# Patient Record
Sex: FEMALE | Race: WHITE | NOT HISPANIC OR LATINO | URBAN - METROPOLITAN AREA
[De-identification: names, ages, dates, MRNs, and addresses within clinical notes are randomized per-mention and may not be internally consistent; named-entity substitution may affect disease eponyms.]

---

## 2017-06-05 ENCOUNTER — OUTPATIENT (OUTPATIENT)
Dept: OUTPATIENT SERVICES | Facility: HOSPITAL | Age: 82
LOS: 1 days | End: 2017-06-05

## 2017-06-20 ENCOUNTER — OUTPATIENT (OUTPATIENT)
Dept: OUTPATIENT SERVICES | Facility: HOSPITAL | Age: 82
LOS: 1 days | End: 2017-06-20

## 2017-06-20 ENCOUNTER — INPATIENT (INPATIENT)
Facility: HOSPITAL | Age: 82
LOS: 1 days | Discharge: NURSING, REHAB OR LONG-TERM CARE | End: 2017-06-22
Payer: MEDICARE

## 2017-06-20 PROCEDURE — 72170 X-RAY EXAM OF PELVIS: CPT | Mod: 26

## 2017-06-21 ENCOUNTER — OUTPATIENT (OUTPATIENT)
Dept: OUTPATIENT SERVICES | Facility: HOSPITAL | Age: 82
LOS: 1 days | End: 2017-06-21

## 2017-06-22 ENCOUNTER — INPATIENT (INPATIENT)
Facility: HOSPITAL | Age: 82
LOS: 8 days | Discharge: HOME | End: 2017-07-01

## 2017-06-22 ENCOUNTER — OUTPATIENT (OUTPATIENT)
Dept: OUTPATIENT SERVICES | Facility: HOSPITAL | Age: 82
LOS: 1 days | End: 2017-06-22

## 2023-09-25 PROBLEM — Z00.00 ENCOUNTER FOR PREVENTIVE HEALTH EXAMINATION: Status: ACTIVE | Noted: 2023-09-25

## 2023-09-26 ENCOUNTER — APPOINTMENT (OUTPATIENT)
Dept: BREAST CENTER | Facility: CLINIC | Age: 88
End: 2023-09-26
Payer: MEDICARE

## 2023-09-26 VITALS
TEMPERATURE: 84 F | BODY MASS INDEX: 27.49 KG/M2 | DIASTOLIC BLOOD PRESSURE: 74 MMHG | HEIGHT: 65 IN | HEART RATE: 56 BPM | WEIGHT: 165 LBS | OXYGEN SATURATION: 98 % | SYSTOLIC BLOOD PRESSURE: 134 MMHG

## 2023-09-26 DIAGNOSIS — Z86.39 PERSONAL HISTORY OF OTHER ENDOCRINE, NUTRITIONAL AND METABOLIC DISEASE: ICD-10-CM

## 2023-09-26 DIAGNOSIS — Z78.9 OTHER SPECIFIED HEALTH STATUS: ICD-10-CM

## 2023-09-26 DIAGNOSIS — Z63.4 DISAPPEARANCE AND DEATH OF FAMILY MEMBER: ICD-10-CM

## 2023-09-26 DIAGNOSIS — Z80.42 FAMILY HISTORY OF MALIGNANT NEOPLASM OF PROSTATE: ICD-10-CM

## 2023-09-26 DIAGNOSIS — Z86.79 PERSONAL HISTORY OF OTHER DISEASES OF THE CIRCULATORY SYSTEM: ICD-10-CM

## 2023-09-26 PROCEDURE — 99204 OFFICE O/P NEW MOD 45 MIN: CPT

## 2023-09-26 SDOH — SOCIAL STABILITY - SOCIAL INSECURITY: DISSAPEARANCE AND DEATH OF FAMILY MEMBER: Z63.4

## 2023-09-27 PROBLEM — Z78.9 DOES NOT USE ILLICIT DRUGS: Status: ACTIVE | Noted: 2023-09-26

## 2023-09-27 PROBLEM — Z78.9 NON-SMOKER: Status: ACTIVE | Noted: 2023-09-26

## 2023-09-27 PROBLEM — Z63.4 WIDOWED: Status: ACTIVE | Noted: 2023-09-26

## 2023-09-27 PROBLEM — Z78.9 NO FAMILY HISTORY OF BREAST CANCER: Status: ACTIVE | Noted: 2023-09-27

## 2023-09-27 PROBLEM — Z78.9 SOCIAL ALCOHOL USE: Status: ACTIVE | Noted: 2023-09-26

## 2023-09-27 PROBLEM — Z80.42 FAMILY HISTORY OF MALIGNANT NEOPLASM OF PROSTATE: Status: ACTIVE | Noted: 2023-09-26

## 2023-09-27 RX ORDER — ATORVASTATIN CALCIUM 80 MG/1
TABLET, FILM COATED ORAL
Refills: 0 | Status: ACTIVE | COMMUNITY

## 2023-09-27 RX ORDER — LEVOTHYROXINE SODIUM 0.17 MG/1
TABLET ORAL
Refills: 0 | Status: ACTIVE | COMMUNITY

## 2023-09-27 RX ORDER — SOLIFENACIN SUCCINATE 10 MG/1
TABLET ORAL
Refills: 0 | Status: ACTIVE | COMMUNITY

## 2023-09-27 RX ORDER — VIT A/VIT C/VIT E/ZINC/COPPER 4296-226
CAPSULE ORAL
Refills: 0 | Status: ACTIVE | COMMUNITY

## 2023-09-27 RX ORDER — CARVEDILOL 3.12 MG/1
TABLET, FILM COATED ORAL
Refills: 0 | Status: ACTIVE | COMMUNITY

## 2023-09-28 PROBLEM — Z86.39 HISTORY OF HASHIMOTO THYROIDITIS: Status: RESOLVED | Noted: 2023-09-26 | Resolved: 2023-09-28

## 2023-09-28 PROBLEM — Z86.39 HISTORY OF HIGH CHOLESTEROL: Status: RESOLVED | Noted: 2023-09-26 | Resolved: 2023-09-28

## 2023-09-28 PROBLEM — Z86.79 HISTORY OF HYPERTENSION: Status: RESOLVED | Noted: 2023-09-26 | Resolved: 2023-09-28

## 2023-10-12 ENCOUNTER — NON-APPOINTMENT (OUTPATIENT)
Age: 88
End: 2023-10-12

## 2023-10-12 ENCOUNTER — APPOINTMENT (OUTPATIENT)
Dept: MRI IMAGING | Facility: CLINIC | Age: 88
End: 2023-10-12
Payer: MEDICARE

## 2023-10-12 PROCEDURE — A9585: CPT | Mod: JW

## 2023-10-12 PROCEDURE — 77049 MRI BREAST C-+ W/CAD BI: CPT

## 2023-10-13 ENCOUNTER — OUTPATIENT (OUTPATIENT)
Dept: OUTPATIENT SERVICES | Facility: HOSPITAL | Age: 88
LOS: 1 days | End: 2023-10-13

## 2023-10-13 ENCOUNTER — NON-APPOINTMENT (OUTPATIENT)
Age: 88
End: 2023-10-13

## 2023-10-13 DIAGNOSIS — C50.919 MALIGNANT NEOPLASM OF UNSPECIFIED SITE OF UNSPECIFIED FEMALE BREAST: ICD-10-CM

## 2023-10-19 ENCOUNTER — APPOINTMENT (OUTPATIENT)
Dept: HEMATOLOGY ONCOLOGY | Facility: CLINIC | Age: 88
End: 2023-10-19
Payer: MEDICARE

## 2023-10-19 VITALS
TEMPERATURE: 98.1 F | HEIGHT: 65 IN | HEART RATE: 58 BPM | DIASTOLIC BLOOD PRESSURE: 84 MMHG | WEIGHT: 162 LBS | OXYGEN SATURATION: 94 % | SYSTOLIC BLOOD PRESSURE: 193 MMHG | BODY MASS INDEX: 26.99 KG/M2

## 2023-10-19 PROCEDURE — 99205 OFFICE O/P NEW HI 60 MIN: CPT

## 2023-10-19 RX ORDER — TIMOLOL MALEATE 5 MG/ML
SOLUTION/ DROPS OPHTHALMIC
Refills: 0 | Status: ACTIVE | COMMUNITY

## 2023-10-21 ENCOUNTER — APPOINTMENT (OUTPATIENT)
Dept: RADIOLOGY | Facility: CLINIC | Age: 88
End: 2023-10-21
Payer: MEDICARE

## 2023-10-21 PROCEDURE — 77080 DXA BONE DENSITY AXIAL: CPT

## 2023-11-17 ENCOUNTER — APPOINTMENT (OUTPATIENT)
Dept: HEMATOLOGY ONCOLOGY | Facility: CLINIC | Age: 88
End: 2023-11-17
Payer: MEDICARE

## 2023-11-17 VITALS
OXYGEN SATURATION: 96 % | BODY MASS INDEX: 27.99 KG/M2 | HEART RATE: 64 BPM | SYSTOLIC BLOOD PRESSURE: 169 MMHG | HEIGHT: 65 IN | DIASTOLIC BLOOD PRESSURE: 89 MMHG | TEMPERATURE: 97.4 F | WEIGHT: 168 LBS

## 2023-11-17 PROCEDURE — 99214 OFFICE O/P EST MOD 30 MIN: CPT

## 2024-01-30 ENCOUNTER — RESULT REVIEW (OUTPATIENT)
Age: 89
End: 2024-01-30

## 2024-01-30 ENCOUNTER — NON-APPOINTMENT (OUTPATIENT)
Age: 89
End: 2024-01-30

## 2024-01-30 ENCOUNTER — APPOINTMENT (OUTPATIENT)
Dept: ULTRASOUND IMAGING | Facility: CLINIC | Age: 89
End: 2024-01-30
Payer: MEDICARE

## 2024-01-30 PROCEDURE — 76641 ULTRASOUND BREAST COMPLETE: CPT | Mod: LT,GY

## 2024-02-08 ENCOUNTER — OUTPATIENT (OUTPATIENT)
Dept: OUTPATIENT SERVICES | Facility: HOSPITAL | Age: 89
LOS: 1 days | End: 2024-02-08

## 2024-02-08 DIAGNOSIS — C50.919 MALIGNANT NEOPLASM OF UNSPECIFIED SITE OF UNSPECIFIED FEMALE BREAST: ICD-10-CM

## 2024-02-12 ENCOUNTER — APPOINTMENT (OUTPATIENT)
Dept: HEMATOLOGY ONCOLOGY | Facility: CLINIC | Age: 89
End: 2024-02-12
Payer: MEDICARE

## 2024-02-12 VITALS
HEIGHT: 65 IN | HEART RATE: 59 BPM | WEIGHT: 150 LBS | OXYGEN SATURATION: 95 % | TEMPERATURE: 96.4 F | BODY MASS INDEX: 24.99 KG/M2 | DIASTOLIC BLOOD PRESSURE: 71 MMHG | SYSTOLIC BLOOD PRESSURE: 177 MMHG

## 2024-02-12 PROCEDURE — G2211 COMPLEX E/M VISIT ADD ON: CPT

## 2024-02-12 PROCEDURE — 99214 OFFICE O/P EST MOD 30 MIN: CPT

## 2024-02-12 RX ORDER — PANTOPRAZOLE 40 MG/1
40 TABLET, DELAYED RELEASE ORAL
Refills: 0 | Status: ACTIVE | COMMUNITY

## 2024-02-12 NOTE — PHYSICAL EXAM
[Restricted in physically strenuous activity but ambulatory and able to carry out work of a light or sedentary nature] : Status 1- Restricted in physically strenuous activity but ambulatory and able to carry out work of a light or sedentary nature, e.g., light house work, office work [Normal] : affect appropriate [de-identified] : generally well appearing elderly female, NAD, pleasant  [de-identified] : palpable mass in the L breast measuring 2-3cm, slight skin dimpling, no other palpable masses or LAD bilaterally- L breast mass feels softer on exam

## 2024-02-12 NOTE — HISTORY OF PRESENT ILLNESS
[de-identified] : Referred by:  Dr. Melinda Clark PCP:  Sivakumar Hager - Russ Walker (ph [phone])           Kulwant Walker - (ph [phone])   Breast Cancer Summary:  DIAGNOSIS: Left breast cancer  PROCEDURE AND DATE: Left breast biopsy 23  PATHOLOGY: invasive ductal carcinoma, grade 3, ER+, OH+, Her-2/myrtle negative.  STAGE:  clinical stage II  Chemotherapy: oncotype 29- on neoadjuvant arimidex at this time  Radiation:  pending  Hormonal:  Neoadjuvant arimidex started 2023  STATUS: active, on neoadjuvant therapy  BRCA/Genetics STATUS: Equ[employer] DATA multi-gene panel 23 with VUS in [facility]  Missy Walker is a post-menopausal female who presented at age 89 in 2023 for evaluation of left breast cancer. The patient has a medical history of HTN, HLD, Hashimoto thyroiditis, heart murmur (mitral valve), early macular degeneration (on vitamins).  Surgical hx: bilateral hip replacement, hysterectomy w/ BSO (heavy menstrual bleeding), non-melanomatous skin cancer removed R forearm, hx lumbar spine injections.  Her history dates back to 2023 when she palpated a pea-sized lump in the left breast.  Imaging showed spiculated mass in the superior aspect of the left breast with dimpling of the overlying skin.  Biopsy on 23 showed invasive ductal carcinoma, grade 3, ER+, OH+, Her-2/myrtle negative.  She saw Dr. Melinda Clark on 23 for initial consult.  Breast MRI was completed on 10/12/23 and revealed a 3cm tumor, no evidence of lymphadenopathy or multicentric disease.  She was also referred to radiation oncology - still needs to schedule.     At today's visit she reports she is in her usual state of health. Denies recent headaches, visual changes, balance issues, CP, cough, SOB, n/v/d, constipation, unintentional weight loss or new bone or back pain. She is independent in her ADLs. Her son Abraham lives with her.   Imaging reviewed: 23 - bilateral mammogram w/ L US - speculated mass in superior aspect of left breast with dimpling of overlying skin measuring approximately 2.5 cm (not including fine radiating lines).   10/12/23 - breast MRI - biopsy-proven left breast cancer (no significant lymphadenopathy or evidence for multicentric or contralateral disease).  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 - Smoking/etoh/illicits:  Never smoker, occasional wine w/ dinner, no illicits  - Exercise:  none   OB/GYN Hx: - Age of menarche:  12 - Age of menopause:  hysterectomy @ 40 - Pregnancy history:   (1 passed away);  - Age at live birth: 22 - OCP use:  < 1 yr - Fertility treatments:  n/a - Hormonal therapy:  Premarin after hysterectomy --> can't remember how long she took - Breast feeding history:  none   FH: - son (Russ) - prostate cancer - son (Kulwant) - prostate cancer  [de-identified] : Missy presents on 2/12/24 for follow up for L breast cancer.   At today's visit Missy is doing very well. She initiated arimidex in September 2023- she is tolerating this very well. Denies hot flashes, vaginal dryness or joint pains. Feels the breast mass may be slightly smaller.  Reviewed normal DEXA results from 10/21/23- continues to take calcium and vitamin D.  Her oncotype report resulted as a 29, corresponding to 18% risk of distant recurrence, >15% chemotherapy benefit.   Breast US 1/30/24 reviewed: Suspicious mass again noted in the 12:00 location. There is slight decrease in size when measured in a similar plane from the priors. Mass currently measures 2.4 x 1.6 cm where previous measurements were 2.8 x 1.8 cm

## 2024-02-12 NOTE — RESULTS/DATA
[FreeTextEntry1] : #Left breast cancer- clinical stage T2N0, anatomic stage IIA, pathologic prognostic stage IB Palpated a mass in the left breast in August 2023.  She is now s/p L breast biopsy 9/21/23 which revealed invasive ductal carcinoma (IDC), poorly differentiated, measuring 15mm, w/ DCIS (solid architecture, intermediate grade), ER %, MD %, Her-2/myrtle negative (1+), ki67 20%.  The mass measures 3cm in maximum diameter on MRI, there was no significant lymphadenopathy.  We discussed the excellent prognosis of stage I, ER positive, MD positive, node negative breast cancer. We explained that recent data suggests that chemotherapy may be spared for many patients with this type of breast cancer (up to 85%). We discussed the use of Oncotype DX genomic profile to determine the risk of recurrence and benefit from chemotherapy based on the results of the Tailor Rx Study to better determine which patients should receive chemotherapy in addition to hormonal therapy.  At today's visit we discussed that she would likely benefit from neoadjuvant therapy given the early skin changes in the left breast.  Discussed arimidex 1mg PO daily for anti-estrogen therapy. She initiated arimidex in September 2023- she is tolerating this very well. Denies hot flashes, vaginal dryness or joint pains.  Feels the breast mass may be slightly smaller at today's visit.  Her oncotype report resulted as a 29, corresponding to 18% risk of distant recurrence, >15% chemotherapy benefit.  Case discussed w/ multidisciplinary team- would continue neoadjuvant arimidex at this time, will defer chemotherapy at this time unless she does not respond to AI therapy.  Repeat breast US Jan 2024 shows slight decrease in size of the L breast mass. It feels softer on exam.  Will continue neoadjuvant arimidex, repeat L breast US in early May 2024.  If the tumor starts to progress, consider other endocrine options vs. CMF.  Plan ultimately for possible lumpectomy and consideration for RT.  Genetics:  [employer] multi-gene panel 9/26/23 with VUS in [facility] All patient questions answered at today's visit. Patient urged to call the office with any questions or concerns.   I personally have spent a total of 35 minutes of time on the date of this encounter reviewing test results, documenting findings, coordinating care and directly consulting with the patient and/or designated family member. Greater than 50% of the face to face encounter time was spent on counseling and/or coordination of care for left breast cancer.

## 2024-03-04 ENCOUNTER — NON-APPOINTMENT (OUTPATIENT)
Age: 89
End: 2024-03-04

## 2024-03-05 ENCOUNTER — NON-APPOINTMENT (OUTPATIENT)
Age: 89
End: 2024-03-05

## 2024-03-22 ENCOUNTER — RX ONLY (RX ONLY)
Age: 89
End: 2024-03-22

## 2024-03-22 ENCOUNTER — OFFICE (OUTPATIENT)
Dept: URBAN - METROPOLITAN AREA CLINIC 9 | Facility: CLINIC | Age: 89
Setting detail: OPHTHALMOLOGY
End: 2024-03-22
Payer: MEDICARE

## 2024-03-22 DIAGNOSIS — H02.135: ICD-10-CM

## 2024-03-22 DIAGNOSIS — H01.001: ICD-10-CM

## 2024-03-22 DIAGNOSIS — H18.423: ICD-10-CM

## 2024-03-22 DIAGNOSIS — H00.12: ICD-10-CM

## 2024-03-22 DIAGNOSIS — H02.132: ICD-10-CM

## 2024-03-22 DIAGNOSIS — H01.004: ICD-10-CM

## 2024-03-22 DIAGNOSIS — H40.1130: ICD-10-CM

## 2024-03-22 PROCEDURE — 99203 OFFICE O/P NEW LOW 30 MIN: CPT | Performed by: OPHTHALMOLOGY

## 2024-03-22 ASSESSMENT — LID EXAM ASSESSMENTS
OD_BLEPHARITIS: RUL 2+
OS_BLEPHARITIS: LUL 2+

## 2024-03-22 ASSESSMENT — LID POSITION - ECTROPION
OD_ECTROPION: RLL 2+
OS_ECTROPION: LLL 2+

## 2024-05-14 ENCOUNTER — RESULT REVIEW (OUTPATIENT)
Age: 89
End: 2024-05-14

## 2024-05-14 ENCOUNTER — APPOINTMENT (OUTPATIENT)
Dept: ULTRASOUND IMAGING | Facility: CLINIC | Age: 89
End: 2024-05-14
Payer: MEDICARE

## 2024-05-14 PROCEDURE — 76642 ULTRASOUND BREAST LIMITED: CPT | Mod: LT

## 2024-05-22 ENCOUNTER — OUTPATIENT (OUTPATIENT)
Dept: OUTPATIENT SERVICES | Facility: HOSPITAL | Age: 89
LOS: 1 days | End: 2024-05-22

## 2024-05-22 DIAGNOSIS — C50.919 MALIGNANT NEOPLASM OF UNSPECIFIED SITE OF UNSPECIFIED FEMALE BREAST: ICD-10-CM

## 2024-05-24 ENCOUNTER — OFFICE (OUTPATIENT)
Dept: URBAN - METROPOLITAN AREA CLINIC 9 | Facility: CLINIC | Age: 89
Setting detail: OPHTHALMOLOGY
End: 2024-05-24
Payer: MEDICARE

## 2024-05-24 DIAGNOSIS — H02.135: ICD-10-CM

## 2024-05-24 DIAGNOSIS — Z96.1: ICD-10-CM

## 2024-05-24 DIAGNOSIS — H40.1130: ICD-10-CM

## 2024-05-24 DIAGNOSIS — H01.001: ICD-10-CM

## 2024-05-24 DIAGNOSIS — H02.132: ICD-10-CM

## 2024-05-24 DIAGNOSIS — H18.423: ICD-10-CM

## 2024-05-24 DIAGNOSIS — H01.004: ICD-10-CM

## 2024-05-24 DIAGNOSIS — H52.4: ICD-10-CM

## 2024-05-24 DIAGNOSIS — H35.033: ICD-10-CM

## 2024-05-24 PROBLEM — H31.013 MACULAR SCAR; BOTH EYES: Status: ACTIVE | Noted: 2024-05-24

## 2024-05-24 PROCEDURE — 92015 DETERMINE REFRACTIVE STATE: CPT | Performed by: OPHTHALMOLOGY

## 2024-05-24 PROCEDURE — 92250 FUNDUS PHOTOGRAPHY W/I&R: CPT | Performed by: OPHTHALMOLOGY

## 2024-05-24 PROCEDURE — 92014 COMPRE OPH EXAM EST PT 1/>: CPT | Performed by: OPHTHALMOLOGY

## 2024-05-24 ASSESSMENT — LID EXAM ASSESSMENTS
OD_BLEPHARITIS: RUL 2+
OS_BLEPHARITIS: LUL 2+

## 2024-05-24 ASSESSMENT — LID POSITION - ECTROPION
OS_ECTROPION: LLL 2+
OD_ECTROPION: RLL 2+

## 2024-05-24 ASSESSMENT — CONFRONTATIONAL VISUAL FIELD TEST (CVF)
OD_FINDINGS: FULL
OS_FINDINGS: FULL

## 2024-05-30 ENCOUNTER — APPOINTMENT (OUTPATIENT)
Dept: HEMATOLOGY ONCOLOGY | Facility: CLINIC | Age: 89
End: 2024-05-30
Payer: MEDICARE

## 2024-05-30 VITALS
BODY MASS INDEX: 24.98 KG/M2 | DIASTOLIC BLOOD PRESSURE: 72 MMHG | TEMPERATURE: 98.2 F | OXYGEN SATURATION: 96 % | SYSTOLIC BLOOD PRESSURE: 168 MMHG | WEIGHT: 150.13 LBS | HEART RATE: 66 BPM | RESPIRATION RATE: 17 BRPM

## 2024-05-30 DIAGNOSIS — C50.812 MALIGNANT NEOPLASM OF OVERLAPPING SITES OF LEFT FEMALE BREAST: ICD-10-CM

## 2024-05-30 DIAGNOSIS — Z17.0 MALIGNANT NEOPLASM OF OVERLAPPING SITES OF LEFT FEMALE BREAST: ICD-10-CM

## 2024-05-30 PROCEDURE — G2211 COMPLEX E/M VISIT ADD ON: CPT

## 2024-05-30 PROCEDURE — 99214 OFFICE O/P EST MOD 30 MIN: CPT

## 2024-05-30 NOTE — HISTORY OF PRESENT ILLNESS
[de-identified] : Referred by:  Dr. Melinda Clark PCP:  Sivakumar Hager - Russ Walker (ph [phone])           Kulwant Walker - (ph [phone])   Breast Cancer Summary:  DIAGNOSIS: Left breast cancer  PROCEDURE AND DATE: Left breast biopsy 23  PATHOLOGY: invasive ductal carcinoma, grade 3, ER+, MS+, Her-2/myrtle negative.  STAGE:  clinical stage II  Chemotherapy: oncotype 29- on neoadjuvant arimidex at this time  Radiation:  pending  Hormonal:  Neoadjuvant arimidex started 2023  STATUS: active, on neoadjuvant therapy  BRCA/Genetics STATUS: [employer] multi-gene panel 23 with VUS in [facility]  Missy Walker is a post-menopausal female who presented at age 89 in 2023 for evaluation of left breast cancer. The patient has a medical history of HTN, HLD, Hashimoto thyroiditis, heart murmur (mitral valve), early macular degeneration (on vitamins).  Surgical hx: bilateral hip replacement, hysterectomy w/ BSO (heavy menstrual bleeding), non-melanomatous skin cancer removed R forearm, hx lumbar spine injections.  Her history dates back to 2023 when she palpated a pea-sized lump in the left breast.  Imaging showed spiculated mass in the superior aspect of the left breast with dimpling of the overlying skin.  Biopsy on 23 showed invasive ductal carcinoma, grade 3, ER+, MS+, Her-2/myrtle negative.  She saw Dr. Melinda Clark on 23 for initial consult.  Breast MRI was completed on 10/12/23 and revealed a 3cm tumor, no evidence of lymphadenopathy or multicentric disease.  She was also referred to radiation oncology - still needs to schedule.     At today's visit she reports she is in her usual state of health. Denies recent headaches, visual changes, balance issues, CP, cough, SOB, n/v/d, constipation, unintentional weight loss or new bone or back pain. She is independent in her ADLs. Her son Abraham lives with her.   Imaging reviewed: 23 - bilateral mammogram w/ L US - speculated mass in superior aspect of left breast with dimpling of overlying skin measuring approximately 2.5 cm (not including fine radiating lines).   10/12/23 - breast MRI - biopsy-proven left breast cancer (no significant lymphadenopathy or evidence for multicentric or contralateral disease).  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 - Smoking/etoh/illicits:  Never smoker, occasional wine w/ dinner, no illicits  - Exercise:  none   OB/GYN Hx: - Age of menarche:  12 - Age of menopause:  hysterectomy @ 40 - Pregnancy history:   (1 passed away);  - Age at live birth: 22 - OCP use:  < 1 yr - Fertility treatments:  n/a - Hormonal therapy:  Premarin after hysterectomy --> can't remember how long she took - Breast feeding history:  none   FH: - son (Russ) - prostate cancer - son (Kulwant) - prostate cancer  [de-identified] : Missy presents on 5/30/24 for follow up for L breast cancer.   At today's visit Missy is doing very well. She initiated arimidex in September 2023- she is tolerating this very well. Denies hot flashes, vaginal dryness or joint pains. Feels the breast mass may be slightly smaller.  Breast US 5/14/24- stable left breast cancer, not significantly changed. Denies recent headaches, visual changes, balance issues, CP, cough, SOB, n/v/d, constipation, unintentional weight loss or new bone or back pain.   DEXA 10/21/23- normal bone density, continues to take calcium and vitamin D.  Her oncotype report resulted as a 29, corresponding to 18% risk of distant recurrence, >15% chemotherapy benefit.  Breast US 5/14/24- stable left breast cancer, not significantly changed

## 2024-05-30 NOTE — RESULTS/DATA
[FreeTextEntry1] : #Left breast cancer- clinical stage T2N0, anatomic stage IIA, pathologic prognostic stage IB Palpated a mass in the left breast in August 2023.  She is now s/p L breast biopsy 9/21/23 which revealed invasive ductal carcinoma (IDC), poorly differentiated, measuring 15mm, w/ DCIS (solid architecture, intermediate grade), ER %, [place] %, Her-2/myrtle negative (1+), ki67 20%.  The mass measures 3cm in maximum diameter on MRI, there was no significant lymphadenopathy.  We discussed the excellent prognosis of stage I, ER positive, MT positive, node negative breast cancer. We explained that recent data suggests that chemotherapy may be spared for many patients with this type of breast cancer (up to 85%). We discussed the use of Oncotype DX genomic profile to determine the risk of recurrence and benefit from chemotherapy based on the results of the Tailor Rx Study to better determine which patients should receive chemotherapy in addition to hormonal therapy.  At today's visit we discussed that she would likely benefit from neoadjuvant therapy given the early skin changes in the left breast.  Discussed arimidex 1mg PO daily for anti-estrogen therapy. She initiated arimidex in September 2023- she is tolerating this very well. Denies hot flashes, vaginal dryness or joint pains.  Feels the breast mass may be slightly smaller at today's visit.  Her oncotype report resulted as a 29, corresponding to 18% risk of distant recurrence, >15% chemotherapy benefit.  Case discussed w/ multidisciplinary team- would continue neoadjuvant arimidex at this time, will defer chemotherapy at this time unless she does not respond to AI therapy.  Breast US 5/14/24- stable left breast cancer, not significantly changed.  Will continue neoadjuvant arimidex, repeat L breast US in August 2024.  If the tumor starts to progress, consider other endocrine options vs. CMF.  Plan ultimately for possible lumpectomy and consideration for RT.  Genetics:  [employer] multi-gene panel 9/26/23 with VUS in [facility] All patient questions answered at today's visit. Patient urged to call the office with any questions or concerns.   I personally have spent a total of 35 minutes of time on the date of this encounter reviewing test results, documenting findings, coordinating care and directly consulting with the patient and/or designated family member. Greater than 50% of the face to face encounter time was spent on counseling and/or coordination of care for left breast cancer.

## 2024-05-30 NOTE — PHYSICAL EXAM
[Restricted in physically strenuous activity but ambulatory and able to carry out work of a light or sedentary nature] : Status 1- Restricted in physically strenuous activity but ambulatory and able to carry out work of a light or sedentary nature, e.g., light house work, office work [Normal] : affect appropriate [de-identified] : generally well appearing elderly female, NAD, pleasant  [de-identified] : palpable mass in the L breast measuring 2-3cm, slight skin dimpling, no other palpable masses or LAD bilaterally- L breast mass feels softer on exam

## 2024-05-30 NOTE — HISTORY OF PRESENT ILLNESS
[de-identified] : Referred by:  Dr. Melinda Clark PCP:  Sivakumar Hager - Russ Walker (ph [phone])           Kulwant Walker - (ph [phone])   Breast Cancer Summary:  DIAGNOSIS: Left breast cancer  PROCEDURE AND DATE: Left breast biopsy 23  PATHOLOGY: invasive ductal carcinoma, grade 3, ER+, AL+, Her-2/myrtle negative.  STAGE:  clinical stage II  Chemotherapy: oncotype 29- on neoadjuvant arimidex at this time  Radiation:  pending  Hormonal:  Neoadjuvant arimidex started 2023  STATUS: active, on neoadjuvant therapy  BRCA/Genetics STATUS: [employer] multi-gene panel 23 with VUS in [facility]  Missy Walker is a post-menopausal female who presented at age 89 in 2023 for evaluation of left breast cancer. The patient has a medical history of HTN, HLD, Hashimoto thyroiditis, heart murmur (mitral valve), early macular degeneration (on vitamins).  Surgical hx: bilateral hip replacement, hysterectomy w/ BSO (heavy menstrual bleeding), non-melanomatous skin cancer removed R forearm, hx lumbar spine injections.  Her history dates back to 2023 when she palpated a pea-sized lump in the left breast.  Imaging showed spiculated mass in the superior aspect of the left breast with dimpling of the overlying skin.  Biopsy on 23 showed invasive ductal carcinoma, grade 3, ER+, AL+, Her-2/myrtle negative.  She saw Dr. Melinda Clark on 23 for initial consult.  Breast MRI was completed on 10/12/23 and revealed a 3cm tumor, no evidence of lymphadenopathy or multicentric disease.  She was also referred to radiation oncology - still needs to schedule.     At today's visit she reports she is in her usual state of health. Denies recent headaches, visual changes, balance issues, CP, cough, SOB, n/v/d, constipation, unintentional weight loss or new bone or back pain. She is independent in her ADLs. Her son Abraham lives with her.   Imaging reviewed: 23 - bilateral mammogram w/ L US - speculated mass in superior aspect of left breast with dimpling of overlying skin measuring approximately 2.5 cm (not including fine radiating lines).   10/12/23 - breast MRI - biopsy-proven left breast cancer (no significant lymphadenopathy or evidence for multicentric or contralateral disease).  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 - Smoking/etoh/illicits:  Never smoker, occasional wine w/ dinner, no illicits  - Exercise:  none   OB/GYN Hx: - Age of menarche:  12 - Age of menopause:  hysterectomy @ 40 - Pregnancy history:   (1 passed away);  - Age at live birth: 22 - OCP use:  < 1 yr - Fertility treatments:  n/a - Hormonal therapy:  Premarin after hysterectomy --> can't remember how long she took - Breast feeding history:  none   FH: - son (Russ) - prostate cancer - son (Kulwant) - prostate cancer  [de-identified] : Missy presents on 5/30/24 for follow up for L breast cancer.   At today's visit Missy is doing very well. She initiated arimidex in September 2023- she is tolerating this very well. Denies hot flashes, vaginal dryness or joint pains. Feels the breast mass may be slightly smaller.  Breast US 5/14/24- stable left breast cancer, not significantly changed. Denies recent headaches, visual changes, balance issues, CP, cough, SOB, n/v/d, constipation, unintentional weight loss or new bone or back pain.   DEXA 10/21/23- normal bone density, continues to take calcium and vitamin D.  Her oncotype report resulted as a 29, corresponding to 18% risk of distant recurrence, >15% chemotherapy benefit.  Breast US 5/14/24- stable left breast cancer, not significantly changed

## 2024-05-30 NOTE — BEGINNING OF VISIT
[0] : 1) Little interest or pleasure doing things: Not at all (0) [1] : 2) Feeling down, depressed, or hopeless for several days (1) [PHQ-2 Negative] : PHQ-2 Negative [[id number]] : 1 [Future Reassessment of Pain Scale] : Future reassessment of pain scale  [Date Discussed (MM/DD/YY): ___] :  Discussed: [unfilled] [With Patient/Caregiver] : with Patient/Caregiver

## 2024-05-30 NOTE — PHYSICAL EXAM
[Restricted in physically strenuous activity but ambulatory and able to carry out work of a light or sedentary nature] : Status 1- Restricted in physically strenuous activity but ambulatory and able to carry out work of a light or sedentary nature, e.g., light house work, office work [Normal] : affect appropriate [de-identified] : generally well appearing elderly female, NAD, pleasant  [de-identified] : palpable mass in the L breast measuring 2-3cm, slight skin dimpling, no other palpable masses or LAD bilaterally- L breast mass feels softer on exam

## 2024-05-30 NOTE — BEGINNING OF VISIT
[0] : 1) Little interest or pleasure doing things: Not at all (0) [1] : 2) Feeling down, depressed, or hopeless for several days (1) [PHQ-2 Negative] : PHQ-2 Negative [[id number]] : 1 [Future Reassessment of Pain Scale] : Future reassessment of pain scale  [Date Discussed (MM/DD/YY): ___] :  Discussed: [unfilled] [With Patient/Caregiver] : with Patient/Caregiver

## 2024-05-30 NOTE — RESULTS/DATA
[FreeTextEntry1] : #Left breast cancer- clinical stage T2N0, anatomic stage IIA, pathologic prognostic stage IB Palpated a mass in the left breast in August 2023.  She is now s/p L breast biopsy 9/21/23 which revealed invasive ductal carcinoma (IDC), poorly differentiated, measuring 15mm, w/ DCIS (solid architecture, intermediate grade), ER %, [place] %, Her-2/myrtle negative (1+), ki67 20%.  The mass measures 3cm in maximum diameter on MRI, there was no significant lymphadenopathy.  We discussed the excellent prognosis of stage I, ER positive, [place] positive, node negative breast cancer. We explained that recent data suggests that chemotherapy may be spared for many patients with this type of breast cancer (up to 85%). We discussed the use of Oncotype DX genomic profile to determine the risk of recurrence and benefit from chemotherapy based on the results of the Tailor Rx Study to better determine which patients should receive chemotherapy in addition to hormonal therapy.  At today's visit we discussed that she would likely benefit from neoadjuvant therapy given the early skin changes in the left breast.  Discussed arimidex 1mg PO daily for anti-estrogen therapy. She initiated arimidex in September 2023- she is tolerating this very well. Denies hot flashes, vaginal dryness or joint pains.  Feels the breast mass may be slightly smaller at today's visit.  Her oncotype report resulted as a 29, corresponding to 18% risk of distant recurrence, >15% chemotherapy benefit.  Case discussed w/ multidisciplinary team- would continue neoadjuvant arimidex at this time, will defer chemotherapy at this time unless she does not respond to AI therapy.  Breast US 5/14/24- stable left breast cancer, not significantly changed.  Will continue neoadjuvant arimidex, repeat L breast US in August 2024.  If the tumor starts to progress, consider other endocrine options vs. CMF.  Plan ultimately for possible lumpectomy and consideration for RT.  Genetics:  [employer] multi-gene panel 9/26/23 with VUS in [facility] All patient questions answered at today's visit. Patient urged to call the office with any questions or concerns.   I personally have spent a total of 35 minutes of time on the date of this encounter reviewing test results, documenting findings, coordinating care and directly consulting with the patient and/or designated family member. Greater than 50% of the face to face encounter time was spent on counseling and/or coordination of care for left breast cancer.

## 2024-08-15 ENCOUNTER — RESULT REVIEW (OUTPATIENT)
Age: 89
End: 2024-08-15

## 2024-08-15 ENCOUNTER — APPOINTMENT (OUTPATIENT)
Dept: ULTRASOUND IMAGING | Facility: CLINIC | Age: 89
End: 2024-08-15
Payer: MEDICARE

## 2024-08-15 DIAGNOSIS — Z17.0 MALIGNANT NEOPLASM OF OVERLAPPING SITES OF LEFT FEMALE BREAST: ICD-10-CM

## 2024-08-15 DIAGNOSIS — C50.812 MALIGNANT NEOPLASM OF OVERLAPPING SITES OF LEFT FEMALE BREAST: ICD-10-CM

## 2024-08-15 PROCEDURE — 76642 ULTRASOUND BREAST LIMITED: CPT | Mod: LT

## 2024-08-16 ENCOUNTER — NON-APPOINTMENT (OUTPATIENT)
Age: 89
End: 2024-08-16

## 2024-09-12 ENCOUNTER — OUTPATIENT (OUTPATIENT)
Dept: OUTPATIENT SERVICES | Facility: HOSPITAL | Age: 89
LOS: 1 days | End: 2024-09-12

## 2024-09-12 DIAGNOSIS — C50.919 MALIGNANT NEOPLASM OF UNSPECIFIED SITE OF UNSPECIFIED FEMALE BREAST: ICD-10-CM

## 2024-09-19 ENCOUNTER — APPOINTMENT (OUTPATIENT)
Dept: HEMATOLOGY ONCOLOGY | Facility: CLINIC | Age: 89
End: 2024-09-19
Payer: MEDICARE

## 2024-09-19 VITALS
HEIGHT: 65 IN | TEMPERATURE: 97.2 F | HEART RATE: 61 BPM | SYSTOLIC BLOOD PRESSURE: 178 MMHG | BODY MASS INDEX: 23.46 KG/M2 | OXYGEN SATURATION: 98 % | DIASTOLIC BLOOD PRESSURE: 100 MMHG | WEIGHT: 140.8 LBS

## 2024-09-19 DIAGNOSIS — C50.812 MALIGNANT NEOPLASM OF OVERLAPPING SITES OF LEFT FEMALE BREAST: ICD-10-CM

## 2024-09-19 DIAGNOSIS — Z17.0 MALIGNANT NEOPLASM OF OVERLAPPING SITES OF LEFT FEMALE BREAST: ICD-10-CM

## 2024-09-19 PROCEDURE — 99214 OFFICE O/P EST MOD 30 MIN: CPT

## 2024-09-19 PROCEDURE — G2211 COMPLEX E/M VISIT ADD ON: CPT

## 2024-09-19 RX ORDER — AMOXICILLIN AND CLAVULANATE POTASSIUM 875; 125 MG/1; MG/1
875-125 TABLET, COATED ORAL
Refills: 0 | Status: ACTIVE | COMMUNITY

## 2024-09-19 NOTE — PHYSICAL EXAM
[Fully active, able to carry on all pre-disease performance without restriction] : Status 0 - Fully active, able to carry on all pre-disease performance without restriction [Normal] : affect appropriate [de-identified] : generally well appearing elderly female, NAD, pleasant  [de-identified] : palpable mass in the L breast measuring 2-3cm, slight skin dimpling, no other palpable masses or LAD bilaterally- L breast mass feels softer on exam

## 2024-09-19 NOTE — RESULTS/DATA
[FreeTextEntry1] : #Left breast cancer- clinical stage T2N0, anatomic stage IIA, pathologic prognostic stage IB Palpated a mass in the left breast in August 2023.  She is now s/p L breast biopsy 9/21/23 which revealed invasive ductal carcinoma (IDC), poorly differentiated, measuring 15mm, w/ DCIS (solid architecture, intermediate grade), ER %, ME %, Her-2/myrtle negative (1+), ki67 20%.  The mass measures 3cm in maximum diameter on MRI, there was no significant lymphadenopathy.  We discussed the excellent prognosis of stage I, ER positive, ME positive, node negative breast cancer. We explained that recent data suggests that chemotherapy may be spared for many patients with this type of breast cancer (up to 85%). We discussed the use of Oncotype DX genomic profile to determine the risk of recurrence and benefit from chemotherapy based on the results of the T[employer]or Rx Study to better determine which patients should receive chemotherapy in addition to hormonal therapy.  At today's visit we discussed that she would likely benefit from neoadjuvant therapy given the early skin changes in the left breast.  Discussed arimidex 1mg PO daily for anti-estrogen therapy. She initiated arimidex in September 2023- she is tolerating this very well. Denies hot flashes, vaginal dryness or joint pains.  Feels the breast mass may be slightly smaller at today's visit.  Her oncotype report resulted as a 29, corresponding to 18% risk of distant recurrence, >15% chemotherapy benefit.  Case discussed w/ multidisciplinary team- would continue neoadjuvant arimidex at this time, will defer chemotherapy at this time unless she does not respond to AI therapy.  Left breast US 8/15/24- not significantly changed from 5/14/2024 and diminished from 9/21/2023. Continue arimidex. Repeat left breast US 1/2025. RTC 6/2025 in person.  If the tumor starts to progress, consider other endocrine options vs. CMF.  Left breast skin lesion- refer to dermatology.  Genetics:  [employer] multi-gene panel 9/26/23 with VUS in [facility] All patient questions answered at today's visit. Patient urged to call the office with any questions or concerns.   I personally have spent a total of 35 minutes of time on the date of this encounter reviewing test results, documenting findings, coordinating care and directly consulting with the patient and/or designated family member. Greater than 50% of the face to face encounter time was spent on counseling and/or coordination of care for left breast cancer.

## 2024-09-19 NOTE — PHYSICAL EXAM
[Fully active, able to carry on all pre-disease performance without restriction] : Status 0 - Fully active, able to carry on all pre-disease performance without restriction [Normal] : affect appropriate [de-identified] : generally well appearing elderly female, NAD, pleasant  [de-identified] : palpable mass in the L breast measuring 2-3cm, slight skin dimpling, no other palpable masses or LAD bilaterally- L breast mass feels softer on exam

## 2024-09-19 NOTE — RESULTS/DATA
[FreeTextEntry1] : #Left breast cancer- clinical stage T2N0, anatomic stage IIA, pathologic prognostic stage IB Palpated a mass in the left breast in August 2023.  She is now s/p L breast biopsy 9/21/23 which revealed invasive ductal carcinoma (IDC), poorly differentiated, measuring 15mm, w/ DCIS (solid architecture, intermediate grade), ER %, NV %, Her-2/myrtle negative (1+), ki67 20%.  The mass measures 3cm in maximum diameter on MRI, there was no significant lymphadenopathy.  We discussed the excellent prognosis of stage I, ER positive, NV positive, node negative breast cancer. We explained that recent data suggests that chemotherapy may be spared for many patients with this type of breast cancer (up to 85%). We discussed the use of Oncotype DX genomic profile to determine the risk of recurrence and benefit from chemotherapy based on the results of the Tailor Rx Study to better determine which patients should receive chemotherapy in addition to hormonal therapy.  At today's visit we discussed that she would likely benefit from neoadjuvant therapy given the early skin changes in the left breast.  Discussed arimidex 1mg PO daily for anti-estrogen therapy. She initiated arimidex in September 2023- she is tolerating this very well. Denies hot flashes, vaginal dryness or joint pains.  Feels the breast mass may be slightly smaller at today's visit.  Her oncotype report resulted as a 29, corresponding to 18% risk of distant recurrence, >15% chemotherapy benefit.  Case discussed w/ multidisciplinary team- would continue neoadjuvant arimidex at this time, will defer chemotherapy at this time unless she does not respond to AI therapy.  Left breast US 8/15/24- not significantly changed from 5/14/2024 and diminished from 9/21/2023. Continue arimidex. Repeat left breast US 1/2025. RTC 6/2025 in person.  If the tumor starts to progress, consider other endocrine options vs. CMF.  Left breast skin lesion- refer to dermatology.  Genetics:  [employer] multi-gene panel 9/26/23 with VUS in [facility] All patient questions answered at today's visit. Patient urged to call the office with any questions or concerns.   I personally have spent a total of 35 minutes of time on the date of this encounter reviewing test results, documenting findings, coordinating care and directly consulting with the patient and/or designated family member. Greater than 50% of the face to face encounter time was spent on counseling and/or coordination of care for left breast cancer.

## 2024-09-19 NOTE — HISTORY OF PRESENT ILLNESS
[de-identified] : Referred by:  Dr. Melinda Clark PCP:  Sivakumar Hager - Russ Walker (ph [phone])           Kulwant Walker - (ph [phone])   Breast Cancer Summary:  DIAGNOSIS: Left breast cancer  PROCEDURE AND DATE: Left breast biopsy 23  PATHOLOGY: invasive ductal carcinoma, grade 3, ER+, DC+, Her-2/myrtle negative.  STAGE:  clinical stage II  Chemotherapy: oncotype 29- on neoadjuvant arimidex at this time  Radiation:  pending  Hormonal:  Neoadjuvant arimidex started 2023  STATUS: active, on neoadjuvant therapy  BRCA/Genetics STATUS: [employer] multi-gene panel 23 with VUS in [facility]  Missy Walker is a post-menopausal female who presented at age 89 in 2023 for evaluation of left breast cancer. The patient has a medical history of HTN, HLD, Hashimoto thyroiditis, heart murmur (mitral valve), early macular degeneration (on vitamins).  Surgical hx: bilateral hip replacement, hysterectomy w/ BSO (heavy menstrual bleeding), non-melanomatous skin cancer removed R forearm, hx lumbar spine injections.  Her history dates back to 2023 when she palpated a pea-sized lump in the left breast.  Imaging showed spiculated mass in the superior aspect of the left breast with dimpling of the overlying skin.  Biopsy on 23 showed invasive ductal carcinoma, grade 3, ER+, DC+, Her-2/myrtle negative.  She saw Dr. Melinda Clark on 23 for initial consult.  Breast MRI was completed on 10/12/23 and revealed a 3cm tumor, no evidence of lymphadenopathy or multicentric disease.  She was also referred to radiation oncology - still needs to schedule.     At today's visit she reports she is in her usual state of health. Denies recent headaches, visual changes, balance issues, CP, cough, SOB, n/v/d, constipation, unintentional weight loss or new bone or back pain. She is independent in her ADLs. Her son Abraham lives with her.   Imaging reviewed: 23 - bilateral mammogram w/ L US - speculated mass in superior aspect of left breast with dimpling of overlying skin measuring approximately 2.5 cm (not including fine radiating lines).   10/12/23 - breast MRI - biopsy-proven left breast cancer (no significant lymphadenopathy or evidence for multicentric or contralateral disease).  Mass measures 3 x 1.7cm 3 x 1.7cm L breast mass in the superior left breast  Pathology reviewed: Left breast biopsy 23 - invasive ductal carcinoma, poorly differentiated, measuring 15mm, w/ DCIS (solid architecture, intermediate grade), ER %, DC %, Her-2/myrtle negative (1+), ki67 20%   HCM: - COVID vaccination:  Moderna x2, 1 booster - flu shots - 10/2023 - Colonoscopy:  none prior - Gyn:  no longer follows - Mammo:  as above - Lung cancer screen:  n/a - DEXA: done 10/21/23- normal bone density    SH: - Occupation:  Retired homemaker - Living situation:  [place] resident --> returns to [place] as needed to be near family (lives w/ son in [place]). - Smoking/etoh/illicits:  Never smoker, occasional wine w/ dinner, no illicits  - Exercise:  none   OB/GYN Hx: - Age of menarche:  12 - Age of menopause:  hysterectomy @ 40 - Pregnancy history:   (1 passed away);  - Age at live birth: 22 - OCP use:  < 1 yr - Fertility treatments:  n/a - Hormonal therapy:  Premarin after hysterectomy --> can't remember how long she took - Breast feeding history:  none   FH: - son (Russ) - prostate cancer - son (Kulwant) - prostate cancer  [de-identified] : Missy presents on 9/19/24 for follow up for L breast cancer.   At today's visit Missy is doing very well. She initiated arimidex in September 2023- she is tolerating this very well. Denies hot flashes, vaginal dryness or joint pains. Feels the breast mass may be slightly smaller. Had a tooth pulled yesterday. Her BP is elevated today- denies CP, HA.  She is hoping to go to [place] for the winter- from November through May.    Left breast US 8/15/24- 2.1 cm,  12:00 N3,  known malignancy, not significantly changed from 5/14/2024 and diminished from 9/21/2023.  Breast US 5/14/24- stable left breast cancer, not significantly changed. Denies recent headaches, visual changes, balance issues, CP, cough, SOB, n/v/d, constipation, unintentional weight loss or new bone or back pain.   DEXA 10/21/23- normal bone density, continues to take calcium and vitamin D.  Her oncotype report resulted as a 29, corresponding to 18% risk of distant recurrence, >15% chemotherapy benefit.  98

## 2025-01-09 ENCOUNTER — RESULT REVIEW (OUTPATIENT)
Age: 89
End: 2025-01-09

## 2025-01-09 ENCOUNTER — APPOINTMENT (OUTPATIENT)
Dept: ULTRASOUND IMAGING | Facility: CLINIC | Age: 89
End: 2025-01-09
Payer: MEDICARE

## 2025-01-09 ENCOUNTER — NON-APPOINTMENT (OUTPATIENT)
Age: 89
End: 2025-01-09

## 2025-01-09 PROCEDURE — 76641 ULTRASOUND BREAST COMPLETE: CPT | Mod: LT,GA

## 2025-01-21 ENCOUNTER — OUTPATIENT (OUTPATIENT)
Dept: OUTPATIENT SERVICES | Facility: HOSPITAL | Age: 89
LOS: 1 days | End: 2025-01-21

## 2025-01-21 DIAGNOSIS — C50.919 MALIGNANT NEOPLASM OF UNSPECIFIED SITE OF UNSPECIFIED FEMALE BREAST: ICD-10-CM

## 2025-01-27 ENCOUNTER — APPOINTMENT (OUTPATIENT)
Dept: HEMATOLOGY ONCOLOGY | Facility: CLINIC | Age: 89
End: 2025-01-27
Payer: MEDICARE

## 2025-01-27 VITALS
BODY MASS INDEX: 23.32 KG/M2 | OXYGEN SATURATION: 97 % | HEART RATE: 62 BPM | HEIGHT: 65 IN | RESPIRATION RATE: 16 BRPM | TEMPERATURE: 97.2 F | DIASTOLIC BLOOD PRESSURE: 79 MMHG | WEIGHT: 140 LBS | SYSTOLIC BLOOD PRESSURE: 146 MMHG

## 2025-01-27 DIAGNOSIS — C50.812 MALIGNANT NEOPLASM OF OVERLAPPING SITES OF LEFT FEMALE BREAST: ICD-10-CM

## 2025-01-27 DIAGNOSIS — Z17.0 MALIGNANT NEOPLASM OF OVERLAPPING SITES OF LEFT FEMALE BREAST: ICD-10-CM

## 2025-01-27 PROCEDURE — 99214 OFFICE O/P EST MOD 30 MIN: CPT | Mod: 2W

## 2025-01-27 PROCEDURE — G2211 COMPLEX E/M VISIT ADD ON: CPT | Mod: 2W

## 2025-03-20 ENCOUNTER — RX RENEWAL (OUTPATIENT)
Age: 89
End: 2025-03-20

## 2025-05-22 ENCOUNTER — RESULT REVIEW (OUTPATIENT)
Age: 89
End: 2025-05-22

## 2025-05-22 ENCOUNTER — APPOINTMENT (OUTPATIENT)
Dept: ULTRASOUND IMAGING | Facility: CLINIC | Age: 89
End: 2025-05-22
Payer: MEDICARE

## 2025-05-22 PROCEDURE — 76641 ULTRASOUND BREAST COMPLETE: CPT | Mod: 50,GA

## 2025-06-03 ENCOUNTER — OUTPATIENT (OUTPATIENT)
Dept: OUTPATIENT SERVICES | Facility: HOSPITAL | Age: 89
LOS: 1 days | End: 2025-06-03

## 2025-06-03 DIAGNOSIS — C50.9: ICD-10-CM

## 2025-06-05 ENCOUNTER — APPOINTMENT (OUTPATIENT)
Dept: HEMATOLOGY ONCOLOGY | Facility: CLINIC | Age: 89
End: 2025-06-05
Payer: MEDICARE

## 2025-06-05 VITALS
BODY MASS INDEX: 21.63 KG/M2 | TEMPERATURE: 98 F | DIASTOLIC BLOOD PRESSURE: 83 MMHG | SYSTOLIC BLOOD PRESSURE: 161 MMHG | HEIGHT: 65 IN | WEIGHT: 129.8 LBS | OXYGEN SATURATION: 97 % | HEART RATE: 61 BPM

## 2025-06-05 DIAGNOSIS — Z17.0 MALIGNANT NEOPLASM OF OVERLAPPING SITES OF LEFT FEMALE BREAST: ICD-10-CM

## 2025-06-05 DIAGNOSIS — C50.812 MALIGNANT NEOPLASM OF OVERLAPPING SITES OF LEFT FEMALE BREAST: ICD-10-CM

## 2025-06-05 PROCEDURE — 99213 OFFICE O/P EST LOW 20 MIN: CPT

## 2025-06-05 PROCEDURE — G2211 COMPLEX E/M VISIT ADD ON: CPT

## 2025-06-05 RX ORDER — AMLODIPINE BESYLATE 5 MG/1
5 TABLET ORAL
Refills: 0 | Status: ACTIVE | COMMUNITY

## 2025-06-16 ENCOUNTER — APPOINTMENT (OUTPATIENT)
Dept: HEMATOLOGY ONCOLOGY | Facility: CLINIC | Age: 89
End: 2025-06-16

## 2025-07-08 ENCOUNTER — NON-APPOINTMENT (OUTPATIENT)
Age: 89
End: 2025-07-08

## 2025-07-08 ENCOUNTER — APPOINTMENT (OUTPATIENT)
Dept: BREAST CENTER | Facility: CLINIC | Age: 89
End: 2025-07-08
Payer: MEDICARE

## 2025-07-08 VITALS
TEMPERATURE: 97.6 F | WEIGHT: 130 LBS | OXYGEN SATURATION: 97 % | DIASTOLIC BLOOD PRESSURE: 60 MMHG | SYSTOLIC BLOOD PRESSURE: 146 MMHG | HEART RATE: 61 BPM | BODY MASS INDEX: 21.66 KG/M2 | HEIGHT: 65 IN

## 2025-07-08 PROCEDURE — 99214 OFFICE O/P EST MOD 30 MIN: CPT

## 2025-07-08 RX ORDER — ESCITALOPRAM OXALATE 10 MG/1
10 TABLET, FILM COATED ORAL TWICE DAILY
Refills: 0 | Status: ACTIVE | COMMUNITY

## 2025-07-08 RX ORDER — TIMOLOL MALEATE 5 MG/ML
0.5 SOLUTION OPHTHALMIC TWICE DAILY
Refills: 0 | Status: ACTIVE | COMMUNITY

## 2025-07-08 RX ORDER — SOLIFENACIN SUCCINATE 10 MG/1
TABLET, FILM COATED ORAL DAILY
Refills: 0 | Status: ACTIVE | COMMUNITY

## 2025-07-08 RX ORDER — CARVEDILOL 6.25 MG/1
6.25 TABLET, FILM COATED ORAL TWICE DAILY
Refills: 0 | Status: ACTIVE | COMMUNITY